# Patient Record
Sex: FEMALE | Race: WHITE | NOT HISPANIC OR LATINO | Employment: FULL TIME | URBAN - METROPOLITAN AREA
[De-identification: names, ages, dates, MRNs, and addresses within clinical notes are randomized per-mention and may not be internally consistent; named-entity substitution may affect disease eponyms.]

---

## 2021-05-24 ENCOUNTER — RECORDS - HEALTHEAST (OUTPATIENT)
Dept: ADMINISTRATIVE | Facility: CLINIC | Age: 56
End: 2021-05-24

## 2023-11-23 ENCOUNTER — HOSPITAL ENCOUNTER (EMERGENCY)
Facility: CLINIC | Age: 58
Discharge: HOME | End: 2023-11-23
Attending: EMERGENCY MEDICINE | Admitting: EMERGENCY MEDICINE
Payer: COMMERCIAL

## 2023-11-23 ENCOUNTER — APPOINTMENT (OUTPATIENT)
Dept: GENERAL RADIOLOGY | Facility: CLINIC | Age: 58
End: 2023-11-23
Attending: EMERGENCY MEDICINE
Payer: COMMERCIAL

## 2023-11-23 VITALS
HEIGHT: 66 IN | DIASTOLIC BLOOD PRESSURE: 66 MMHG | SYSTOLIC BLOOD PRESSURE: 106 MMHG | OXYGEN SATURATION: 99 % | WEIGHT: 140 LBS | TEMPERATURE: 98.1 F | HEART RATE: 103 BPM | BODY MASS INDEX: 22.5 KG/M2

## 2023-11-23 DIAGNOSIS — S42.292A OTHER CLOSED DISPLACED FRACTURE OF PROXIMAL END OF LEFT HUMERUS, INITIAL ENCOUNTER: ICD-10-CM

## 2023-11-23 PROBLEM — I10 HYPERTENSION: Status: ACTIVE | Noted: 2017-12-13

## 2023-11-23 LAB
ANION GAP SERPL CALCULATED.3IONS-SCNC: 19 MMOL/L (ref 7–15)
BASOPHILS # BLD AUTO: 0 10E3/UL (ref 0–0.2)
BASOPHILS NFR BLD AUTO: 0 %
BUN SERPL-MCNC: 9.4 MG/DL (ref 8–23)
CALCIUM SERPL-MCNC: 9 MG/DL (ref 8.6–10)
CHLORIDE SERPL-SCNC: 90 MMOL/L (ref 98–107)
CREAT SERPL-MCNC: 0.77 MG/DL (ref 0.51–0.95)
DEPRECATED HCO3 PLAS-SCNC: 23 MMOL/L (ref 22–29)
EGFRCR SERPLBLD CKD-EPI 2021: 88 ML/MIN/1.73M2
EOSINOPHIL # BLD AUTO: 0 10E3/UL (ref 0–0.7)
EOSINOPHIL NFR BLD AUTO: 0 %
ERYTHROCYTE [DISTWIDTH] IN BLOOD BY AUTOMATED COUNT: 11.1 % (ref 10–15)
GLUCOSE SERPL-MCNC: 106 MG/DL (ref 70–99)
HCT VFR BLD AUTO: 32.4 % (ref 35–47)
HGB BLD-MCNC: 12 G/DL (ref 11.7–15.7)
IMM GRANULOCYTES # BLD: 0 10E3/UL
IMM GRANULOCYTES NFR BLD: 0 %
LYMPHOCYTES # BLD AUTO: 1.1 10E3/UL (ref 0.8–5.3)
LYMPHOCYTES NFR BLD AUTO: 15 %
MCH RBC QN AUTO: 33.1 PG (ref 26.5–33)
MCHC RBC AUTO-ENTMCNC: 37 G/DL (ref 31.5–36.5)
MCV RBC AUTO: 89 FL (ref 78–100)
MONOCYTES # BLD AUTO: 0.6 10E3/UL (ref 0–1.3)
MONOCYTES NFR BLD AUTO: 7 %
NEUTROPHILS # BLD AUTO: 5.7 10E3/UL (ref 1.6–8.3)
NEUTROPHILS NFR BLD AUTO: 78 %
NRBC # BLD AUTO: 0 10E3/UL
NRBC BLD AUTO-RTO: 0 /100
PLATELET # BLD AUTO: 315 10E3/UL (ref 150–450)
POTASSIUM SERPL-SCNC: 3.3 MMOL/L (ref 3.4–5.3)
RBC # BLD AUTO: 3.63 10E6/UL (ref 3.8–5.2)
SODIUM SERPL-SCNC: 132 MMOL/L (ref 135–145)
WBC # BLD AUTO: 7.4 10E3/UL (ref 4–11)

## 2023-11-23 PROCEDURE — 23600 CLTX PROX HUMRL FX W/O MNPJ: CPT | Mod: 54 | Performed by: EMERGENCY MEDICINE

## 2023-11-23 PROCEDURE — 96376 TX/PRO/DX INJ SAME DRUG ADON: CPT

## 2023-11-23 PROCEDURE — 23600 CLTX PROX HUMRL FX W/O MNPJ: CPT | Mod: 55 | Performed by: ORTHOPAEDIC SURGERY

## 2023-11-23 PROCEDURE — 99285 EMERGENCY DEPT VISIT HI MDM: CPT | Mod: 25 | Performed by: EMERGENCY MEDICINE

## 2023-11-23 PROCEDURE — 250N000011 HC RX IP 250 OP 636: Mod: JZ | Performed by: EMERGENCY MEDICINE

## 2023-11-23 PROCEDURE — 99284 EMERGENCY DEPT VISIT MOD MDM: CPT | Mod: 25

## 2023-11-23 PROCEDURE — 96374 THER/PROPH/DIAG INJ IV PUSH: CPT

## 2023-11-23 PROCEDURE — 36415 COLL VENOUS BLD VENIPUNCTURE: CPT | Performed by: EMERGENCY MEDICINE

## 2023-11-23 PROCEDURE — 85025 COMPLETE CBC W/AUTO DIFF WBC: CPT | Performed by: EMERGENCY MEDICINE

## 2023-11-23 PROCEDURE — 73030 X-RAY EXAM OF SHOULDER: CPT | Mod: LT

## 2023-11-23 PROCEDURE — 23600 CLTX PROX HUMRL FX W/O MNPJ: CPT | Mod: LT

## 2023-11-23 PROCEDURE — 96375 TX/PRO/DX INJ NEW DRUG ADDON: CPT

## 2023-11-23 PROCEDURE — 80048 BASIC METABOLIC PNL TOTAL CA: CPT | Performed by: EMERGENCY MEDICINE

## 2023-11-23 RX ORDER — OXYCODONE HYDROCHLORIDE 5 MG/1
5 TABLET ORAL EVERY 6 HOURS PRN
Qty: 12 TABLET | Refills: 0 | Status: SHIPPED | OUTPATIENT
Start: 2023-11-23 | End: 2023-11-26

## 2023-11-23 RX ORDER — ONDANSETRON 2 MG/ML
4 INJECTION INTRAMUSCULAR; INTRAVENOUS EVERY 30 MIN PRN
Status: DISCONTINUED | OUTPATIENT
Start: 2023-11-23 | End: 2023-11-23 | Stop reason: HOSPADM

## 2023-11-23 RX ORDER — HYDROMORPHONE HYDROCHLORIDE 1 MG/ML
0.5 INJECTION, SOLUTION INTRAMUSCULAR; INTRAVENOUS; SUBCUTANEOUS EVERY 30 MIN PRN
Status: DISCONTINUED | OUTPATIENT
Start: 2023-11-23 | End: 2023-11-23 | Stop reason: HOSPADM

## 2023-11-23 RX ADMIN — HYDROMORPHONE HYDROCHLORIDE 0.5 MG: 1 INJECTION, SOLUTION INTRAMUSCULAR; INTRAVENOUS; SUBCUTANEOUS at 13:59

## 2023-11-23 RX ADMIN — ONDANSETRON 4 MG: 2 INJECTION INTRAMUSCULAR; INTRAVENOUS at 12:27

## 2023-11-23 RX ADMIN — HYDROMORPHONE HYDROCHLORIDE 0.5 MG: 1 INJECTION, SOLUTION INTRAMUSCULAR; INTRAVENOUS; SUBCUTANEOUS at 12:27

## 2023-11-23 ASSESSMENT — ACTIVITIES OF DAILY LIVING (ADL): ADLS_ACUITY_SCORE: 35

## 2023-11-23 NOTE — ED PROVIDER NOTES
"  History     Chief Complaint   Patient presents with    Fall    Shoulder Pain     HPI  Laura Reza is a 58 year old right-hand-dominant female with history hypertension and alcohol use who presents with acute pain of left shoulder in context of fall last evening.  She says she was drinking last night and has a split-level staircase at her home fell down a few stairs.  She thinks that she may have hit her shoulder on the railing but is unsure.  Possible loss of consciousness but again not sure.  Unable to move her arm secondary to pain.  Took 4 Tylenol at home.  Not on anticoagulants or antiplatelet medications.  No vomiting.  No neck or back pain.  Otherwise feels well.  Injury occurred approximate 11 PM.    The patient's PMHx, Surgical Hx, Allergies, and Medications were all reviewed with the patient.    Allergies:  No Known Allergies    Problem List:    Patient Active Problem List    Diagnosis Date Noted    Hypertension 12/13/2017     Priority: Medium    Tobacco use disorder 07/14/2006     Priority: Medium        Past Medical History:    No past medical history on file.    Past Surgical History:    No past surgical history on file.    Family History:    No family history on file.    Social History:  Marital Status:   [4]        Medications:    oxyCODONE (ROXICODONE) 5 MG tablet          Review of Systems  Pertinent positives and negatives mentioned in HPI    Physical Exam   BP: 106/66  Pulse: 103  Temp: 98.1  F (36.7  C)  Height: 167.6 cm (5' 6\")  Weight: 63.5 kg (140 lb)  SpO2: 99 %    Physical Exam  GEN: Awake, alert, and cooperative.  Acutely distressed secondary to pain  HENT: MMM. External ears and nose normal bilaterally.  Atraumatic.  No hemotympanum or otorrhea.  No retroauricular tenderness or ecchymosis.  No overt signs of facial trauma.  EYES: EOM intact. Conjunctiva clear. No discharge.   NECK: Symmetric, freely mobile.  No midline tenderness to palpation.  No discomfort with lateral " rotation or flexion  CV : Extremities warm and well perfused.  PULM: Normal effort. Speaking in full sentences.  NEURO: Normal speech. Following commands.  Answering questions and interacting appropriately.   EXT: Significant swelling of anterior shoulder with abrasion.  No tenderness to lateral medial condyles.  No tenderness to palpation of forearm wrist or hand.  Sensation intact to light touch in radial, medial, and ulnar distribution of hand.  Good handgrip strength  INT: Warm. No diaphoresis. Normal color.       ED Course        Procedures                 Critical Care time:  none               Results for orders placed or performed during the hospital encounter of 11/23/23 (from the past 24 hour(s))   CBC with platelets differential    Narrative    The following orders were created for panel order CBC with platelets differential.  Procedure                               Abnormality         Status                     ---------                               -----------         ------                     CBC with platelets and d...[[id number]]  Abnormal            Final result                 Please view results for these tests on the individual orders.   Basic metabolic panel   Result Value Ref Range    Sodium 132 (L) 135 - 145 mmol/L    Potassium 3.3 (L) 3.4 - 5.3 mmol/L    Chloride 90 (L) 98 - 107 mmol/L    Carbon Dioxide (CO2) 23 22 - 29 mmol/L    Anion Gap 19 (H) 7 - 15 mmol/L    Urea Nitrogen 9.4 8.0 - 23.0 mg/dL    Creatinine 0.77 0.51 - 0.95 mg/dL    GFR Estimate 88 >60 mL/min/1.73m2    Calcium 9.0 8.6 - 10.0 mg/dL    Glucose 106 (H) 70 - 99 mg/dL   CBC with platelets and differential   Result Value Ref Range    WBC Count 7.4 4.0 - 11.0 10e3/uL    RBC Count 3.63 (L) 3.80 - 5.20 10e6/uL    Hemoglobin 12.0 11.7 - 15.7 g/dL    Hematocrit 32.4 (L) 35.0 - 47.0 %    MCV 89 78 - 100 fL    MCH 33.1 (H) 26.5 - 33.0 pg    MCHC 37.0 (H) 31.5 - 36.5 g/dL    RDW 11.1 10.0 - 15.0 %    Platelet Count 315 150 - 450  10e3/uL    % Neutrophils 78 %    % Lymphocytes 15 %    % Monocytes 7 %    % Eosinophils 0 %    % Basophils 0 %    % Immature Granulocytes 0 %    NRBCs per 100 WBC 0 <1 /100    Absolute Neutrophils 5.7 1.6 - 8.3 10e3/uL    Absolute Lymphocytes 1.1 0.8 - 5.3 10e3/uL    Absolute Monocytes 0.6 0.0 - 1.3 10e3/uL    Absolute Eosinophils 0.0 0.0 - 0.7 10e3/uL    Absolute Basophils 0.0 0.0 - 0.2 10e3/uL    Absolute Immature Granulocytes 0.0 <=0.4 10e3/uL    Absolute NRBCs 0.0 10e3/uL   XR Shoulder Left 2 Views    Narrative    EXAM: XR SHOULDER LEFT 2 VIEWS  LOCATION: [facility]  DATE: 11/23/2023    INDICATION: left shoulder pain, limited exam 2 2 pain, significant swelling of antererior shoulder, CMS intact distally  COMPARISON: None.      Impression    IMPRESSION: Comminuted impacted and displaced proximal left humerus fracture, centered in the surgical neck. There is surrounding soft tissue swelling.    NOTE: ABNORMAL REPORT    THE DICTATION ABOVE DESCRIBES AN ABNORMALITY FOR WHICH FOLLOW-UP IS NEEDED.        Medications   ondansetron (ZOFRAN) injection 4 mg (4 mg Intravenous $Given 11/23/23 1227)   HYDROmorphone (PF) (DILAUDID) injection 0.5 mg (0.5 mg Intravenous $Given 11/23/23 1359)       Assessments & Plan (with Medical Decision Making)   58 year old right-hand-dominant female with history of hypertension and alcohol use with acute pain of left shoulder in context of fall last evening detailed in HPI.  Significant pain and swelling of the left shoulder which limits exam.  Unable to range shoulder secondary to pain.  No sulcus sign.  CMS intact distally.  0.5 mg aliquots of IV hydromorphone for pain control.  Ondansetron prophylactically for nausea vomiting.  Will start with plain film imaging of shoulder.    Plain films of shoulder show comminuted impacted and displaced proximal left humeral fracture centered in the surgical neck.  Case discussed with Dr. Mora with [facility].   Plan for arm sling and follow-up in clinic this next week.  Unknown at this time if this will be nonoperative management.  Will send with pain meds.  ED return precautions and follow-up discussed. Arm sling applied.     CBC without leukocytosis or anemia.  Platelet count 315.  Metabolic panel with modest hyponatremia and hypokalemia.  Could be accounted for by her alcohol use.         I have reviewed the nursing notes.         Discharge Medication List as of 11/23/2023  1:55 PM        START taking these medications    Details   oxyCODONE (ROXICODONE) 5 MG tablet Take 1 tablet (5 mg) by mouth every 6 hours as needed for pain, Disp-12 tablet, R-0, E-Prescribe             Final diagnoses:   Other closed displaced fracture of proximal end of left humerus, initial encounter     Corey Mooney MD        11/23/2023   [facility] EMERGENCY DEPT    Disclaimer: This note consists of words and symbols derived from keyboarding and dictation using voice recognition software.  As a result, there may be errors that have gone undetected.  Please consider this when interpreting information found in this note.               Corey Mooney MD  11/23/23 0606

## 2023-11-23 NOTE — DISCHARGE INSTRUCTIONS
You broke your left arm. Keep your arm in sling for stability. An orthopedic referral has been placed and you will receive a call to schedule an appointment.  Ibuprofen Tylenol for primary pain control.  Oxycodone for breakthrough pain.  If you lose feeling in your arm or hand or have new muscle weakness, please return to the emergency department for further evaluation.  Otherwise please follow-up in orthopedic clinic.    Please take the medication as prescribed. Please do not operate heavy machinery, drive a car, exercise, or perform important tasks while taking this medication as it can make you drowsy and prone to mental lapses. This medication can be addictive, so please only take as needed. This medication can cause your breathing to dangerous slow down if you take too much, so please take only as prescribed. This medication can make you constipated, so please stay well hydrated and take fiber supplements. Return to the emergency department if this medication does not control your pain and be sure to follow up with your primary care provider as advised.

## 2023-11-23 NOTE — ED TRIAGE NOTES
Pt admits to ETOH last night and fell.  Possible +LOC.  Fall unwitnessed.  Pt c/o left shoulder and arm pain with bruising.  Denies head, neck, abd or back pain.  Right great toe pain.     Triage Assessment (Adult)       Row Name 11/23/23 1156          Triage Assessment    Airway WDL WDL        Respiratory WDL    Respiratory WDL WDL        Skin Circulation/Temperature WDL    Skin Circulation/Temperature WDL WDL        Cardiac WDL    Cardiac WDL WDL        Peripheral/Neurovascular WDL    Peripheral Neurovascular WDL WDL        Cognitive/Neuro/Behavioral WDL    Cognitive/Neuro/Behavioral WDL WDL

## 2023-11-29 ENCOUNTER — OFFICE VISIT (OUTPATIENT)
Dept: ORTHOPEDICS | Facility: CLINIC | Age: 58
End: 2023-11-29
Attending: EMERGENCY MEDICINE
Payer: COMMERCIAL

## 2023-11-29 VITALS
SYSTOLIC BLOOD PRESSURE: 118 MMHG | DIASTOLIC BLOOD PRESSURE: 72 MMHG | HEIGHT: 66 IN | WEIGHT: 144 LBS | TEMPERATURE: 97.2 F | BODY MASS INDEX: 23.14 KG/M2

## 2023-11-29 DIAGNOSIS — S42.292A OTHER CLOSED DISPLACED FRACTURE OF PROXIMAL END OF LEFT HUMERUS, INITIAL ENCOUNTER: ICD-10-CM

## 2023-11-29 PROCEDURE — 99213 OFFICE O/P EST LOW 20 MIN: CPT | Mod: 57 | Performed by: ORTHOPAEDIC SURGERY

## 2023-11-29 ASSESSMENT — PAIN SCALES - GENERAL: PAINLEVEL: SEVERE PAIN (6)

## 2023-11-29 NOTE — LETTER
11/29/2023         RE: Laura Reza  [id number] [facility] [facility] [address]        Dear Colleague,    Thank you for referring your patient, Laura Reza, to the [facility]. Please see a copy of my visit note below.    S:  Patient fell sustaining L shoulder injury 11/22/23.  Presents today in a sling.  Has to sleep sitting up.  Tylenol helps with pain.       Patient Active Problem List   Diagnosis     Tobacco use disorder     Hypertension          No past medical history on file.       No past surgical history on file.         Social History     Tobacco Use     Smoking status: Not on file     Smokeless tobacco: Not on file   Substance Use Topics     Alcohol use: Not on file          No family history on file.          No Known Allergies         No current outpatient medications on file.          Review Of Systems  Skin: negative  Eyes: negative  Ears/Nose/Throat: negative  Respiratory: No shortness of breath, dyspnea on exertion, cough, or hemoptysis    O: Physical Exam:  Some ecchymosis about fracture site.  CMS intact to hand/fingers.    Lab:Na 132, K 3.3, Cr 0.77    Images:  Narrative & Impression   EXAM: XR SHOULDER LEFT 2 VIEWS  LOCATION: [facility]  DATE: 11/23/2023     INDICATION: left shoulder pain, limited exam 2 2 pain, significant swelling of antererior shoulder, CMS intact distally  COMPARISON: None.                                                                      IMPRESSION: Comminuted impacted and displaced proximal left humerus fracture, centered in the surgical neck. There is surrounding soft tissue swelling.            A:  Comminuted proximal humeral fracture with adequate relationship humeral head and glenoid.      P:  Continue sling, ice for shoulder comfort.  NSAIDs and tylenol as discussed.  See back next week with images.  May remove sling with arm at side if comfort allows.  Avoid lifting arm for next 3 weeks.  Repeat  shoulder images LUE next week.    Notify if exacerbation symptoms.           In addition to the above assessment and plan each active problem on Laura's problem list was evaluated today. This included the questioning of Laura for any medication problems. We will continue the current treatment plan for these active problems except as noted.        Again, thank you for allowing me to participate in the care of your patient.        Sincerely,        Gerhard Kelly MD

## 2023-11-29 NOTE — PROGRESS NOTES
S:  Patient fell sustaining L shoulder injury 11/22/23.  Presents today in a sling.  Has to sleep sitting up.  Tylenol helps with pain.       Patient Active Problem List   Diagnosis    Tobacco use disorder    Hypertension          No past medical history on file.       No past surgical history on file.         Social History     Tobacco Use    Smoking status: Not on file    Smokeless tobacco: Not on file   Substance Use Topics    Alcohol use: Not on file          No family history on file.          No Known Allergies         No current outpatient medications on file.          Review Of Systems  Skin: negative  Eyes: negative  Ears/Nose/Throat: negative  Respiratory: No shortness of breath, dyspnea on exertion, cough, or hemoptysis    O: Physical Exam:  Some ecchymosis about fracture site.  CMS intact to hand/fingers.    Lab:Na 132, K 3.3, Cr 0.77    Images:  Narrative & Impression   EXAM: XR SHOULDER LEFT 2 VIEWS  LOCATION: [facility]  DATE: 11/23/2023     INDICATION: left shoulder pain, limited exam 2 2 pain, significant swelling of antererior shoulder, CMS intact distally  COMPARISON: None.                                                                      IMPRESSION: Comminuted impacted and displaced proximal left humerus fracture, centered in the surgical neck. There is surrounding soft tissue swelling.            A:  Comminuted proximal humeral fracture with adequate relationship humeral head and glenoid.      P:  Continue sling, ice for shoulder comfort.  NSAIDs and tylenol as discussed.  See back next week with images.  May remove sling with arm at side if comfort allows.  Avoid lifting arm for next 3 weeks.  Repeat shoulder images LUE next week.    Notify if exacerbation symptoms.           In addition to the above assessment and plan each active problem on Laura's problem list was evaluated today. This included the questioning of Laura for any medication problems. We will  continue the current treatment plan for these active problems except as noted.

## 2023-12-05 NOTE — PROGRESS NOTES
S:Patient feeling better, has been using shoulder immobilizer and removing at times to dress or bathe.         Patient Active Problem List   Diagnosis    Tobacco use disorder    Hypertension          No past medical history on file.       No past surgical history on file.         Social History     Tobacco Use    Smoking status: Former     Types: Cigarettes    Smokeless tobacco: Current   Substance Use Topics    Alcohol use: Yes          No family history on file.          No Known Allergies         No current outpatient medications on file.          Review Of Systems  Skin: negative  Eyes: negative  Ears/Nose/Throat: negative  Respiratory: No shortness of breath, dyspnea on exertion, cough, or hemoptysis    O: Physical Exam:  Some swelling L hand.  CMS intact.  No deficits.  Comfortable in shoulder immobilizer.    Lab: No vit D    Images: No DEXA  Shoulder images show comminution proximal humeral fracture but adequate orientation of humeral head with glenoid, less caudal displacement probably as hemarthrosis resolves.  Fracture orientation somewhat improved.         A:  Comminuted proximal humeral fx.  Possible hypovitaminosis D and possible osteoporosis.      P:  Continue shoulder immobilizer.  See back one month with new images.  Discussed Vit D3 and Calcium Citrate supplementation.  Discussed 25 OH Vit D and DEXA which we will order.  She is to have a physical early January  so will add these to her labs if she'd like.  Discussed risk factors such as diet, smoking/caffeine/phytates/genetics.    Notify if exacerbation symptoms.             In addition to the above assessment and plan each active problem on Laura's problem list was evaluated today. This included the questioning of Laura for any medication problems. We will continue the current treatment plan for these active problems except as noted.

## 2023-12-06 ENCOUNTER — OFFICE VISIT (OUTPATIENT)
Dept: ORTHOPEDICS | Facility: CLINIC | Age: 58
End: 2023-12-06
Payer: COMMERCIAL

## 2023-12-06 ENCOUNTER — ANCILLARY PROCEDURE (OUTPATIENT)
Dept: GENERAL RADIOLOGY | Facility: CLINIC | Age: 58
End: 2023-12-06
Attending: ORTHOPAEDIC SURGERY
Payer: COMMERCIAL

## 2023-12-06 VITALS
HEIGHT: 66 IN | TEMPERATURE: 96.6 F | SYSTOLIC BLOOD PRESSURE: 100 MMHG | BODY MASS INDEX: 23.46 KG/M2 | WEIGHT: 146 LBS | DIASTOLIC BLOOD PRESSURE: 76 MMHG

## 2023-12-06 DIAGNOSIS — S42.292A OTHER CLOSED DISPLACED FRACTURE OF PROXIMAL END OF LEFT HUMERUS, INITIAL ENCOUNTER: Primary | ICD-10-CM

## 2023-12-06 DIAGNOSIS — S42.292A OTHER CLOSED DISPLACED FRACTURE OF PROXIMAL END OF LEFT HUMERUS, INITIAL ENCOUNTER: ICD-10-CM

## 2023-12-06 PROCEDURE — 73030 X-RAY EXAM OF SHOULDER: CPT | Mod: TC | Performed by: RADIOLOGY

## 2023-12-06 PROCEDURE — 99207 PR FRACTURE CARE IN GLOBAL PERIOD: CPT | Performed by: ORTHOPAEDIC SURGERY

## 2023-12-06 RX ORDER — LISINOPRIL AND HYDROCHLOROTHIAZIDE 20; 25 MG/1; MG/1
1 TABLET ORAL DAILY
COMMUNITY
Start: 2023-10-31

## 2023-12-06 RX ORDER — LORATADINE 10 MG/1
10 TABLET, ORALLY DISINTEGRATING ORAL DAILY
COMMUNITY

## 2023-12-06 RX ORDER — ROSUVASTATIN CALCIUM 20 MG/1
1 TABLET, COATED ORAL AT BEDTIME
COMMUNITY
Start: 2023-10-31

## 2023-12-06 ASSESSMENT — PAIN SCALES - GENERAL: PAINLEVEL: MODERATE PAIN (4)

## 2023-12-06 NOTE — LETTER
12/6/2023         RE: Laura Reza  [address] [facility] [address] [address]        Dear Colleague,    Thank you for referring your patient, Laura Reza, to the [facility]. Please see a copy of my visit note below.    S:Patient feeling better, has been using shoulder immobilizer and removing at times to dress or bathe.         Patient Active Problem List   Diagnosis     Tobacco use disorder     Hypertension          No past medical history on file.       No past surgical history on file.         Social History     Tobacco Use     Smoking status: Former     Types: Cigarettes     Smokeless tobacco: Current   Substance Use Topics     Alcohol use: Yes          No family history on file.          No Known Allergies         No current outpatient medications on file.          Review Of Systems  Skin: negative  Eyes: negative  Ears/Nose/Throat: negative  Respiratory: No shortness of breath, dyspnea on exertion, cough, or hemoptysis    O: Physical Exam:  Some swelling L hand.  CMS intact.  No deficits.  Comfortable in shoulder immobilizer.    Lab: No vit D    Images: No DEXA  Shoulder images show comminution proximal humeral fracture but adequate orientation of humeral head with glenoid, less caudal displacement probably as hemarthrosis resolves.  Fracture orientation somewhat improved.         A:  Comminuted proximal humeral fx.  Possible hypovitaminosis D and possible osteoporosis.      P:  Continue shoulder immobilizer.  See back one month with new images.  Discussed Vit D3 and Calcium Citrate supplementation.  Discussed 25 OH Vit D and DEXA which we will order.  She is to have a physical early January  so will add these to her labs if she'd like.  Discussed risk factors such as diet, smoking/caffeine/phytates/genetics.    Notify if exacerbation symptoms.             In addition to the above assessment and plan each active problem on Laura's problem list was evaluated today. This  included the questioning of Laura for any medication problems. We will continue the current treatment plan for these active problems except as noted.      Again, thank you for allowing me to participate in the care of your patient.        Sincerely,        Gerhard Kelly MD

## 2024-01-03 ENCOUNTER — OFFICE VISIT (OUTPATIENT)
Dept: ORTHOPEDICS | Facility: CLINIC | Age: 59
End: 2024-01-03
Payer: COMMERCIAL

## 2024-01-03 ENCOUNTER — ANCILLARY PROCEDURE (OUTPATIENT)
Dept: GENERAL RADIOLOGY | Facility: CLINIC | Age: 59
End: 2024-01-03
Attending: ORTHOPAEDIC SURGERY
Payer: COMMERCIAL

## 2024-01-03 VITALS
HEIGHT: 66 IN | TEMPERATURE: 98 F | WEIGHT: 145 LBS | SYSTOLIC BLOOD PRESSURE: 116 MMHG | DIASTOLIC BLOOD PRESSURE: 74 MMHG | BODY MASS INDEX: 23.3 KG/M2

## 2024-01-03 DIAGNOSIS — S42.292A OTHER CLOSED DISPLACED FRACTURE OF PROXIMAL END OF LEFT HUMERUS, INITIAL ENCOUNTER: ICD-10-CM

## 2024-01-03 DIAGNOSIS — S42.292A OTHER CLOSED DISPLACED FRACTURE OF PROXIMAL END OF LEFT HUMERUS, INITIAL ENCOUNTER: Primary | ICD-10-CM

## 2024-01-03 PROCEDURE — 73030 X-RAY EXAM OF SHOULDER: CPT | Mod: TC | Performed by: RADIOLOGY

## 2024-01-03 PROCEDURE — 99207 PR FRACTURE CARE IN GLOBAL PERIOD: CPT | Performed by: ORTHOPAEDIC SURGERY

## 2024-01-03 ASSESSMENT — PAIN SCALES - GENERAL: PAINLEVEL: NO PAIN (0)

## 2024-01-03 NOTE — PROGRESS NOTES
S:  Patient feels she is healing okay.  Did not obtain 25 OH Vit D or DEXA and is having a physical soon and will discuss with primary care.  Shoulder slowly improving.  Traveling to [place] end of month and we discussed  water therapy.         Patient Active Problem List   Diagnosis    Tobacco use disorder    Hypertension          No past medical history on file.       No past surgical history on file.         Social History     Tobacco Use    Smoking status: Former     Types: Cigarettes    Smokeless tobacco: Current   Substance Use Topics    Alcohol use: Yes          No family history on file.          No Known Allergies         Current Outpatient Medications   Medication Sig Dispense Refill    lisinopril-hydrochlorothiazide (ZESTORETIC) 20-25 MG tablet Take 1 tablet by mouth daily      loratadine (CLARITIN REDITABS) 10 MG ODT Take 10 mg by mouth daily      rosuvastatin (CRESTOR) 20 MG tablet Take 1 tablet by mouth at bedtime            Review Of Systems  Skin: negative  Eyes: negative  Ears/Nose/Throat: negative  Respiratory: No shortness of breath, dyspnea on exertion, cough, or hemoptysis    O:  Physical Exam:  Adequate IR/ER LUE with some limits to forward flexion and abduction.  About 40 degrees active forward flexion without symptoms and 60 degrees active abduction.  CMS intact to LUE.  Humeral head rotates with distal humerus.  Less pain with direct palpation about fracture site.    Lab:25 OH Vit D not yet obtained    Images:  DEXA not yet obtained  Adequate orientation fracture fragments  Adequate endosteal and subperiosteal new bone formation  Healing multipartite Proximal humeral fracture LUE         A:  Healing multipartite Proximal humeral fracture LUE      P:  Continue to rehab slowly  Discussed different options for self directed rehab including PT  Patient will let us know if exacerbation symptoms  Continue vit D and ca supplementation  See back 3 mos with images  Notify if exacerbation  symptoms              In addition to the above assessment and plan each active problem on Laura's problem list was evaluated today. This included the questioning of Laura for any medication problems. We will continue the current treatment plan for these active problems except as noted.

## 2024-01-03 NOTE — LETTER
1/3/2024         RE: Laura Reza  [id number] [facility] [address]        Dear Colleague,    Thank you for referring your patient, Laura Reza, to the [facility]. Please see a copy of my visit note below.    S:  Patient feels she is healing okay.  Did not obtain 25 OH Vit D or DEXA and is having a physical soon and will discuss with primary care.  Shoulder slowly improving.  Traveling to [place] end of month and we discussed  water therapy.         Patient Active Problem List   Diagnosis     Tobacco use disorder     Hypertension          No past medical history on file.       No past surgical history on file.         Social History     Tobacco Use     Smoking status: Former     Types: Cigarettes     Smokeless tobacco: Current   Substance Use Topics     Alcohol use: Yes          No family history on file.          No Known Allergies         Current Outpatient Medications   Medication Sig Dispense Refill     lisinopril-hydrochlorothiazide (ZESTORETIC) 20-25 MG tablet Take 1 tablet by mouth daily       loratadine (CLARITIN REDITABS) 10 MG ODT Take 10 mg by mouth daily       rosuvastatin (CRESTOR) 20 MG tablet Take 1 tablet by mouth at bedtime            Review Of Systems  Skin: negative  Eyes: negative  Ears/Nose/Throat: negative  Respiratory: No shortness of breath, dyspnea on exertion, cough, or hemoptysis    O:  Physical Exam:  Adequate IR/ER LUE with some limits to forward flexion and abduction.  About 40 degrees active forward flexion without symptoms and 60 degrees active abduction.  CMS intact to LUE.  Humeral head rotates with distal humerus.  Less pain with direct palpation about fracture site.    Lab:25 OH Vit D not yet obtained    Images:  DEXA not yet obtained  Adequate orientation fracture fragments  Adequate endosteal and subperiosteal new bone formation  Healing multipartite Proximal humeral fracture LUE         A:  Healing multipartite Proximal humeral  fracture LUE      P:  Continue to rehab slowly  Discussed different options for self directed rehab including PT  Patient will let us know if exacerbation symptoms  Continue vit D and ca supplementation  See back 3 mos with images  Notify if exacerbation  symptoms             In addition to the above assessment and plan each active problem on Laura's problem list was evaluated today. This included the questioning of Laura for any medication problems. We will continue the current treatment plan for these active problems except as noted.        Again, thank you for allowing me to participate in the care of your patient.        Sincerely,        Gerhard Kelly MD

## 2024-03-03 ENCOUNTER — HEALTH MAINTENANCE LETTER (OUTPATIENT)
Age: 59
End: 2024-03-03

## 2024-12-07 ENCOUNTER — HEALTH MAINTENANCE LETTER (OUTPATIENT)
Age: 59
End: 2024-12-07

## 2025-03-15 ENCOUNTER — HEALTH MAINTENANCE LETTER (OUTPATIENT)
Age: 60
End: 2025-03-15